# Patient Record
Sex: MALE | Race: WHITE | NOT HISPANIC OR LATINO | URBAN - METROPOLITAN AREA
[De-identification: names, ages, dates, MRNs, and addresses within clinical notes are randomized per-mention and may not be internally consistent; named-entity substitution may affect disease eponyms.]

---

## 2017-02-09 ENCOUNTER — TELEPHONE (OUTPATIENT)
Dept: OTHER | Facility: CLINIC | Age: 37
End: 2017-02-09

## 2017-02-09 NOTE — TELEPHONE ENCOUNTER
2/9/2017    Call Regarding [employer]boarding [employer]    Attempt 1    Message on voicemail     Comments:         Outreach   [place]

## 2017-03-08 NOTE — TELEPHONE ENCOUNTER
3/8/2017    Call Regarding Onboarding [employer]    Attempt 2    Message on voicemail     Comments: 0 Dep      Outreach   KV

## 2017-04-05 NOTE — TELEPHONE ENCOUNTER
4/5/2017    Call Regarding Onboarding [employer]    Attempt 3    Message on voicemail     Comments: 0 Dep      Outreach   KV

## 2017-05-18 ENCOUNTER — OFFICE VISIT (OUTPATIENT)
Dept: FAMILY MEDICINE | Facility: CLINIC | Age: 37
End: 2017-05-18
Payer: COMMERCIAL

## 2017-05-18 VITALS — TEMPERATURE: 98.3 F | DIASTOLIC BLOOD PRESSURE: 79 MMHG | HEART RATE: 64 BPM | SYSTOLIC BLOOD PRESSURE: 120 MMHG

## 2017-05-18 DIAGNOSIS — Z23 NEED FOR VACCINATION: ICD-10-CM

## 2017-05-18 DIAGNOSIS — Z71.84 TRAVEL ADVICE ENCOUNTER: Primary | ICD-10-CM

## 2017-05-18 PROCEDURE — 90691 TYPHOID VACCINE IM: CPT | Mod: GA | Performed by: NURSE PRACTITIONER

## 2017-05-18 PROCEDURE — 90472 IMMUNIZATION ADMIN EACH ADD: CPT | Mod: GA | Performed by: NURSE PRACTITIONER

## 2017-05-18 PROCEDURE — 99402 PREV MED CNSL INDIV APPRX 30: CPT | Mod: 25 | Performed by: NURSE PRACTITIONER

## 2017-05-18 PROCEDURE — 90717 YELLOW FEVER VACCINE SUBQ: CPT | Mod: GA | Performed by: NURSE PRACTITIONER

## 2017-05-18 PROCEDURE — 90471 IMMUNIZATION ADMIN: CPT | Mod: GA | Performed by: NURSE PRACTITIONER

## 2017-05-18 PROCEDURE — 90632 HEPA VACCINE ADULT IM: CPT | Mod: GA | Performed by: NURSE PRACTITIONER

## 2017-05-18 PROCEDURE — 90746 HEPB VACCINE 3 DOSE ADULT IM: CPT | Mod: GA | Performed by: NURSE PRACTITIONER

## 2017-05-18 RX ORDER — ACETAZOLAMIDE 125 MG/1
TABLET ORAL
Qty: 20 TABLET | Refills: 0 | Status: SHIPPED | OUTPATIENT
Start: 2017-05-18

## 2017-05-18 RX ORDER — AZITHROMYCIN 500 MG/1
500 TABLET, FILM COATED ORAL DAILY
Qty: 3 TABLET | Refills: 0 | Status: SHIPPED | OUTPATIENT
Start: 2017-05-18 | End: 2017-05-21

## 2017-05-18 NOTE — PATIENT INSTRUCTIONS
Today May 18, 2017 you received the    Hepatitis A Vaccine -     Hepatitis B Vaccine - .    Yellow Fever (YF)    Typhoid - injectable. This vaccine is valid for two years.   .    These appointments can be made as a NURSE ONLY visit.    **It is very important for the vaccinations to be given on the scheduled day(s), this helps ensure you receive the full effectiveness of the vaccine.**    Please call [facility] with any questions [phone]    Thank you for visiting [facility]

## 2017-05-18 NOTE — MR AVS SNAPSHOT
After Visit Summary   5/18/2017    Mark Shi    MRN: [id number]           Patient Information     Date Of Birth          1980        Visit Information        Provider Department      5/18/2017 3:30 PM Cherelle Sales APRN [facility]        Todays Diagnoses     Travel advice encounter    -  1    Need for vaccination          Care Instructions    Today May 18, 2017 you received the    Hepatitis A Vaccine -     Hepatitis B Vaccine - .    Yellow Fever (YF)    Typhoid - injectable. This vaccine is valid for two years.   .    These appointments can be made as a NURSE ONLY visit.    **It is very important for the vaccinations to be given on the scheduled day(s), this helps ensure you receive the full effectiveness of the vaccine.**    Please call [facility] with any questions [phone]    Thank you for visiting [facility]            Follow-ups after your visit        Who to contact     If you have questions or need follow up information about today's clinic visit or your schedule please contact [facility] directly at [phone].  Normal or non-critical lab and imaging results will be communicated to you by [employer], letter or phone within 4 business days after the clinic has received the results. If you do not hear from us within 7 days, please contact the clinic through [employer] or phone. If you have a critical or abnormal lab result, we will notify you by phone as soon as possible.  Submit refill requests through [employer] or call your pharmacy and they will forward the refill request to us. Please allow 3 business days for your refill to be completed.          Additional Information About Your Visit        MyChart Information     [employer] gives you secure access to your electronic health record. If you see a primary care provider, you can also send messages to your care team and make appointments. If you have  questions, please call your primary care clinic.  If you do not have a primary care provider, please call [phone] and they will assist you.        Care EveryWhere ID     This is your Care EveryWhere ID. This could be used by other organizations to access your [facility] medical records  [id number]        Your Vitals Were     Pulse Temperature                64 98.3  F (36.8  C) (Oral)           Blood Pressure from Last 3 Encounters:   05/18/17 120/79   11/12/12 126/80   08/03/12 155/75    Weight from Last 3 Encounters:   11/12/12 200 lb 1.6 oz (90.8 kg)   08/03/12 190 lb (86.2 kg)              We Performed the Following     HEPA VACCINE ADULT IM     HEPATITIS B VACCINE,ADULT,IM     TYPHOID VACCINE, IM     YELLOW FEVER IMMUNIZATN,LIVE,SUBCUT          Today's Medication Changes          These changes are accurate as of: 5/18/17  4:28 PM.  If you have any questions, ask your nurse or doctor.               Start taking these medicines.        Dose/Directions    acetaZOLAMIDE 125 MG tablet   Commonly known as:  DIAMOX   Used for:  Travel advice encounter   Started by:  Cherelle Sales APRN CNP        To prevent altitude illness: Take one-two tab every 12 hours starting 24 hours prior to ascent and continue for 2 days while at the highest   Quantity:  20 tablet   Refills:  0       azithromycin 500 MG tablet   Commonly known as:  ZITHROMAX   Used for:  Travel advice encounter   Started by:  Cherelle Sales APRN CNP        Dose:  500 mg   Take 1 tablet (500 mg) by mouth daily for 3 doses Take 1 tablet a day for up to 3 days for severe diarrhea   Quantity:  3 tablet   Refills:  0            Where to get your medicines      These medications were sent to [facility] [id number] [place] [address], [place] - [address] AT [facility] [address] [address], [facility] [address]     Phone:  [phone]     acetaZOLAMIDE 125 MG tablet    azithromycin 500 MG tablet                Primary Care  Provider    None Specified       No primary provider on file.        Thank you!     Thank you for choosing [facility]  for your care. Our goal is always to provide you with excellent care. Hearing back from our patients is one way we can continue to improve our services. Please take a few minutes to complete the written survey that you may receive in the mail after your visit with us. Thank you!             Your Updated Medication List - Protect others around you: Learn how to safely use, store and throw away your medicines at www.disposemymeds.org.          This list is accurate as of: 5/18/17  4:28 PM.  Always use your most recent med list.                   Brand Name Dispense Instructions for use    acetaZOLAMIDE 125 MG tablet    DIAMOX    20 tablet    To prevent altitude illness: Take one-two tab every 12 hours starting 24 hours prior to ascent and continue for 2 days while at the highest       albuterol 90 MCG/ACT inhaler      Inhale 2 puffs into the lungs every 6 hours as needed.       ascorbic acid 500 MG tablet    VITAMIN C     Take  by mouth 2 times daily.       azithromycin 500 MG tablet    ZITHROMAX    3 tablet    Take 1 tablet (500 mg) by mouth daily for 3 doses Take 1 tablet a day for up to 3 days for severe diarrhea       Fish Oil-Vitamin D 3021-4261 MG-UNIT Caps      Take  by mouth daily.       FLOVENT DISKUS 100 MCG/BLIST Aepb   Generic drug:  fluticasone      Inhale 1 puff into the lungs every 12 hours.       NEW MED      GNC multivitamin

## 2017-05-18 NOTE — PROGRESS NOTES
Nurse Note      Itinerary:  [place] [place]      Departure Date: 05/27/2017       Return Date: 06/10/2017      Length of Trip 2 weeks      Reason for Travel: Pleasure            Urban or rural: both      Accommodations: Hotel and Friends         IMMUNIZATION HISTORY  Have you received any immunizations within the past 4 weeks?  No  Have you ever fainted from having your blood drawn or from an injection?  No  Have you ever had a fever reaction to vaccination?  No  Have you ever had any bad reaction or side effect from any vaccination?  No  Have you ever had hepatitis A or B vaccine?  Yes  Do you live (or work closely) with anyone who has AIDS, an AIDS-like condition, any other immune disorder or who is on chemotherapy for cancer?  No  Do you have a family history of immunodeficiency?  No  Have you received any injection of immune globulin or any blood products during the past 12 months?  No    Patient roomed by SILVERIO Amin  Mark Shi is a 36 year old male seen today alone for counsultation for international travel to the above locations for Tourism.  Patient will be departing in  9 day(s) and staying for   2 week(s) and  traveling with friends.      Patient itinerary :  will be in the [place] > [facility] > [facility] > [facility] > [facility] > [facility] [place] > [place] > [place]  which presents risk for Yellow Fever, Dengue Fever, Chikungungya, Zika, Schistosomiasis, Rabies, food borne illnesses, motor vehicle accidents, Typhoid and Chagas disease. exposure.      Patient's activities will include sightseeing, hiking and high altitude exposure.    Patient's country of birth is [place]    Special medical concerns: asthma  Pre-travel questionnaire was completed by patient and reviewed by provider.     Vitals: /79  Pulse 64  Temp 98.3  F (36.8  C) (Oral)  BMI= There is no height or weight on file to calculate BMI.    EXAM:  General:  Well-nourished, well-developed in no acute  distress.  Appears to be stated age, interacts appropriately and expresses understanding of information given to patient.    Current Outpatient Prescriptions   Medication Sig Dispense Refill     NEW MED GNC multivitamin       albuterol 90 MCG/ACT inhaler Inhale 2 puffs into the lungs every 6 hours as needed.       fluticasone (FLOVENT DISKUS) 100 MCG/BLIST AEPB Inhale 1 puff into the lungs every 12 hours.       ascorbic acid (VITAMIN C) 500 MG tablet Take  by mouth 2 times daily.       Fish Oil-Cholecalciferol (FISH OIL-VITAMIN D) 9336-0846 MG-UNIT CAPS Take  by mouth daily.       Patient Active Problem List   Diagnosis     CARDIOVASCULAR SCREENING; LDL GOAL LESS THAN 160     No Known Allergies      Immunizations discussed include:   Hepatitis A:  Ordered/given today, risks, benefits and side effects reviewed  Hepatitis B: Ordered/given today, risks, benefits and side effects reviewed  Influenza: Declined  Not concerned about risk of disease  Typhoid: Ordered/given today, risks, benefits and side effects reviewed  Rabies: Insufficient time to vaccinate  Yellow Fever: Ordered/given today - side effects, precautions, allergies, risks discussed. Patient expressed understanding.  [place] Encephalitis: Not indicated  Meningococcus: Not indicated  Tetanus/Diphtheria: Up to date  Measles/Mumps/Rubella: Up to date  Cholera: Not needed  Polio: Up to date  Pneumococcal: Declined  Not concerned about risk of disease  Varicella: Immune by disease history per patient report  Zostavax:  Not indicated  HPV:  Not indicated  TB:  Low risk     Altitude Exposure on this trip: Yes (has had no previous experience)   ASSESSMENT/PLAN:    ICD-10-CM    1. Travel advice encounter Z71.89 acetaZOLAMIDE (DIAMOX) 125 MG tablet     YELLOW FEVER IMMUNIZATN,LIVE,SUBCUT     TYPHOID VACCINE, IM     HEPATITIS B VACCINE,ADULT,IM     azithromycin (ZITHROMAX) 500 MG tablet   2. Need for vaccination Z23 HEPA VACCINE ADULT IM     TYPHOID VACCINE, IM      HEPATITIS B VACCINE,ADULT,IM     I have reviewed general recommendations for safe travel   including: food/water precautions, insect precautions, safer sex   practices given high prevalence of Zika, HIV and other STDs,   roadway safety. Educational materials and [employer] report provided.    [place] prophylaxis recommended: none  Symptomatic treatment for traveler's diarrhea: azithromycin  Altitude illness prevention and treatment: Diamox and discussed early signs      Evacuation insurance advised and resources were provided to patient.    Total visit time 30 minutes  with over 50% of time spent counseling patient as detailed above.    Cherelle Sales CNP

## 2020-02-08 ENCOUNTER — HEALTH MAINTENANCE LETTER (OUTPATIENT)
Age: 40
End: 2020-02-08

## 2020-11-08 ENCOUNTER — HEALTH MAINTENANCE LETTER (OUTPATIENT)
Age: 40
End: 2020-11-08

## 2021-03-28 ENCOUNTER — HEALTH MAINTENANCE LETTER (OUTPATIENT)
Age: 41
End: 2021-03-28

## 2021-09-11 ENCOUNTER — HEALTH MAINTENANCE LETTER (OUTPATIENT)
Age: 41
End: 2021-09-11

## 2022-04-23 ENCOUNTER — HEALTH MAINTENANCE LETTER (OUTPATIENT)
Age: 42
End: 2022-04-23

## 2022-09-02 ENCOUNTER — TRANSFERRED RECORDS (OUTPATIENT)
Dept: HEALTH INFORMATION MANAGEMENT | Facility: CLINIC | Age: 42
End: 2022-09-02

## 2022-09-02 LAB
ALT SERPL-CCNC: 26 IU/L (ref 0–44)
AST SERPL-CCNC: 18 IU/L (ref 0–40)
CHOLESTEROL (EXTERNAL): 218 MG/DL (ref 100–199)
CREATININE (EXTERNAL): 1.05 MG/DL (ref 0.76–1.27)
GFR ESTIMATED (EXTERNAL): 93 ML/MIN/1.73
GLUCOSE (EXTERNAL): 93 MG/DL (ref 65–99)
HBA1C MFR BLD: 5.8 % (ref 4.8–5.6)
HDLC SERPL-MCNC: 43 MG/DL
LDL CHOLESTEROL CALCULATED (EXTERNAL): 157 MG/DL (ref 0–99)
POTASSIUM (EXTERNAL): 4.7 MMOL/L (ref 3.5–5.2)
TRIGLYCERIDES (EXTERNAL): 100 MG/DL (ref 0–149)

## 2022-09-20 ENCOUNTER — MEDICAL CORRESPONDENCE (OUTPATIENT)
Dept: HEALTH INFORMATION MANAGEMENT | Facility: CLINIC | Age: 42
End: 2022-09-20

## 2022-10-30 ENCOUNTER — HEALTH MAINTENANCE LETTER (OUTPATIENT)
Age: 42
End: 2022-10-30

## 2023-11-12 ENCOUNTER — HEALTH MAINTENANCE LETTER (OUTPATIENT)
Age: 43
End: 2023-11-12

## 2025-07-27 ENCOUNTER — HEALTH MAINTENANCE LETTER (OUTPATIENT)
Age: 45
End: 2025-07-27